# Patient Record
Sex: MALE
[De-identification: names, ages, dates, MRNs, and addresses within clinical notes are randomized per-mention and may not be internally consistent; named-entity substitution may affect disease eponyms.]

---

## 2019-01-18 ENCOUNTER — HOSPITAL ENCOUNTER (OUTPATIENT)
Dept: HOSPITAL 5 - XRAY | Age: 59
Discharge: HOME | End: 2019-01-18
Attending: INTERNAL MEDICINE
Payer: COMMERCIAL

## 2019-01-18 DIAGNOSIS — M47.892: ICD-10-CM

## 2019-01-18 DIAGNOSIS — Z96.652: ICD-10-CM

## 2019-01-18 DIAGNOSIS — M43.22: ICD-10-CM

## 2019-01-18 DIAGNOSIS — M17.11: Primary | ICD-10-CM

## 2019-01-18 DIAGNOSIS — M25.461: ICD-10-CM

## 2019-01-18 PROCEDURE — 72040 X-RAY EXAM NECK SPINE 2-3 VW: CPT

## 2019-01-21 NOTE — XRAY REPORT
BILATERAL KNEES, 2 VIEWS



History: Cardiovascular, left knee replacement, right knee pain.



Findings:



Previous left knee replacement is noted and appears stable. No 

abnormality is detected in the left knee.



Within the right knee, there is severe deformity of the lateral tibial 

plateau and lateral femoral condyle. The lateral tibial plateau appears 

compressed. The lateral femoral condyle articular surface is partially 

fragmented. I suspect a previous traumatic injury which has healed 

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 Medial compartment is not 

affected. There are moderate degenerative changes in the patellofemoral 

space. A large joint effusion extends to the suprapatellar bursa.



Impression:

Stable appearance of the left knee prosthesis. Essentially unremarkable 

left knee.

Severe abnormality in the lateral compartment of the right knee 

suggesting previous traumatic injury. Advanced arthritic changes. Large 

joint effusion. Further evaluation of the right knee with MRI is 

recommended.

## 2019-01-21 NOTE — XRAY REPORT
CERVICAL SPINE, 3 views:



History: Cardiovascular, left knee replacement, right knee pain.



No comparison. There has been previous anterior fusion from C4-6. The 

disc spaces at C4-5 and C5-6 are fused. There is straightening of the 

normal lordosis. Mild to moderate degenerative disc disease is 

identified at C3-4 and C6-7 just above and below the fusion. No 

evidence for fracture, subluxation or bone lesion.



IMPRESSION:

Stable appearance of the anterior fusion from C4-6. Mild-to-moderate 

cervical spondylosis.